# Patient Record
Sex: FEMALE | Race: WHITE | NOT HISPANIC OR LATINO | Employment: FULL TIME | URBAN - METROPOLITAN AREA
[De-identification: names, ages, dates, MRNs, and addresses within clinical notes are randomized per-mention and may not be internally consistent; named-entity substitution may affect disease eponyms.]

---

## 2018-09-22 PROBLEM — Z01.419 WELL WOMAN EXAM WITH ROUTINE GYNECOLOGICAL EXAM: Status: ACTIVE | Noted: 2018-09-22

## 2018-09-27 ENCOUNTER — APPOINTMENT (OUTPATIENT)
Dept: LAB | Facility: HOSPITAL | Age: 43
End: 2018-09-27

## 2018-09-27 ENCOUNTER — OFFICE VISIT (OUTPATIENT)
Dept: OBSTETRICS AND GYNECOLOGY | Facility: CLINIC | Age: 43
End: 2018-09-27

## 2018-09-27 VITALS — SYSTOLIC BLOOD PRESSURE: 138 MMHG | RESPIRATION RATE: 14 BRPM | WEIGHT: 175 LBS | DIASTOLIC BLOOD PRESSURE: 72 MMHG

## 2018-09-27 DIAGNOSIS — N94.6 DYSMENORRHEA: ICD-10-CM

## 2018-09-27 DIAGNOSIS — R87.619 ABNORMAL CERVICAL PAPANICOLAOU SMEAR, UNSPECIFIED ABNORMAL PAP FINDING: ICD-10-CM

## 2018-09-27 DIAGNOSIS — N92.1 METRORRHAGIA: Primary | ICD-10-CM

## 2018-09-27 PROBLEM — E55.9 VITAMIN D DEFICIENCY: Status: ACTIVE | Noted: 2017-01-01

## 2018-09-27 LAB
HCT VFR BLD AUTO: 38.5 % (ref 34.5–44)
HGB BLD-MCNC: 11.6 G/DL (ref 11.5–15.5)
TSH SERPL DL<=0.05 MIU/L-ACNC: 1.81 MIU/ML (ref 0.35–5.35)

## 2018-09-27 PROCEDURE — 85018 HEMOGLOBIN: CPT | Performed by: OBSTETRICS & GYNECOLOGY

## 2018-09-27 PROCEDURE — 99214 OFFICE O/P EST MOD 30 MIN: CPT | Performed by: OBSTETRICS & GYNECOLOGY

## 2018-09-27 PROCEDURE — 85014 HEMATOCRIT: CPT | Performed by: OBSTETRICS & GYNECOLOGY

## 2018-09-27 PROCEDURE — 84443 ASSAY THYROID STIM HORMONE: CPT | Performed by: OBSTETRICS & GYNECOLOGY

## 2018-09-27 PROCEDURE — 36415 COLL VENOUS BLD VENIPUNCTURE: CPT | Performed by: OBSTETRICS & GYNECOLOGY

## 2018-09-27 RX ORDER — VENLAFAXINE 37.5 MG/1
37.5 TABLET ORAL 2 TIMES DAILY
COMMUNITY

## 2018-09-27 NOTE — PROGRESS NOTES
"Subjective   Chief Complaint   Patient presents with   • Gynecologic Exam     Es Landry is a 43 y.o. year old  presenting to be seen for her annual exam.     SEXUAL Hx:  She {IS/is not:20960::\"is\"} currently sexually active.  In the past year there {New sexual partners?:71617::\"there has been NO new sexual partners\"}.    Condoms {Condom use:09979::\"are never used\"}.  She {would/WOULD NOT:71043::\"would not\"} like to be screened for STD's at today's exam.  Current birth control method: {Current contraceptive:66567}.  She {IS/is not:47139::\"is\"} happy with her current method of contraception and {does / DOES NOT:95667::\"does not\"} want to discuss alternative methods of contraception.  MENSTRUAL Hx:  Patient's last menstrual period was 2018 (approximate).  In the past 6 months her cycles have been {Desc - menses description:51288::\"regular, predictable and occur monthly\"}.  Her menstrual {Misc; menses description:06740::\"flow is typically normal\"}.   Each month on average there are roughly {Numbers; 0-7:85383::\"0\"} day(s) of very heavy flow.    Intermenstrual bleeding is {absent/present:15329::\"absent\"}.    Post-coital bleeding is {absent/present:85438::\"absent\"}.  Dysmenorrhea: {Affecting ADL?:55718::\"is not affecting her activities of daily living\"}  PMS: {Affecting ADL?:77815::\"is not affecting her activities of daily living\"}  Her cycles {are/ARE NOT:60206::\"are not\"} a source of concern for her that she wishes to discuss today.  HEALTH Hx:  She exercises regularly: {Responses; yes/NO/not asked:10718::\"no (and has no plans to become more active)\"}.  She wears her seat belt: {Responses; yes/not/not always (pre-select yes):41208::\"yes\"}.  She has concerns about domestic violence: {Responses; yes/NO/not asked:30978::\"no\"}.  OTHER THINGS SHE WANTS TO DISCUSS TODAY:  {Other complaints:35280::\"Nothing else\"}    The following portions of the patient's history were reviewed and updated as " "appropriate:{Misc - History reviewed:15162::\"problem list\",\"current medications\",\"allergies\",\"past family history\",\"past medical history\",\"past social history\",\"past surgical history\"}.    Smoking status: Never Smoker                                                                 Smokeless tobacco: Not on file                       Review of Systems  Constitutional POS: {Constitutional (+) ROS:51210::\"nothing reported\"}    NEG: {Constitutional (-) ROS:55143::\"anorexia\",\"night sweats\"}   Genitourinary POS: { (+) ROS:87875::\"nothing reported\"}    NEG: { (-) ROS:77951::\"dysuria\",\"hematuria\"}      Gastointestinal POS: {GI (+) ROS:52136::\"nothing reported\"}    NEG: {GI (-) ROS:43609::\"bloating\",\"change in bowel habits\",\"melena\",\"reflux symptoms\"}   Integument POS: {Skin (+) ROS:29763::\"nothing reported\"}    NEG: {Skin (-) ROS:00451::\"moles that are changing in size, shape, color\",\"rashes\"}   Breast POS: {Breast (+) ROS:21954::\"nothing reported\"}    NEG: {Breast (-) ROS:77855::\"persistent breast lump\",\"skin dimpling\",\"nipple discharge\"}        Objective   /72   Resp 14   Wt 79.4 kg (175 lb)   LMP 09/05/2018 (Approximate)   Breastfeeding? No     General:  {Exam - general findings:07614::\"well developed; well nourished\",\"no acute distress\"}   Skin:  {Exam - skin:25527::\"No suspicious lesions seen\"}   Thyroid: {Exam - thyroid:42501::\"normal to inspection and palpation\"}   Breasts:  {Exam - breasts:37392::\"Examined in supine position\",\"Symmetric without masses or skin dimpling\",\"Nipples normal without inversion, lesions or discharge\",\"There are no palpable axillary nodes\"}   Abdomen: {Exam - abdomen:38240::\"soft, non-tender; no masses\",\"no umbilical or inginual hernias are present\",\"no hepato-splenomegaly\"}   Pelvis: {Exam; GYN pelvic:18782::\"Clinical staff was present for exam\"}        Assessment   1. ***     Plan   1. ***  2. {Were Rx's given:57951}  3. The importance of keeping all planned follow-up and " "taking all medications as prescribed was emphasized.  4. Follow up {F/U reason:83936::\"for annual exam\"} *** {follow-up time:22868}    No orders of the defined types were placed in this encounter.         This note was electronically signed.    Alireza Fowler M.D.  September 27, 2018    Note: Speech recognition transcription software may have been used to create portions of this document.  An attempt at proofreading has been made but errors in transcription could still be present.  "

## 2018-10-01 ENCOUNTER — TELEPHONE (OUTPATIENT)
Dept: OBSTETRICS AND GYNECOLOGY | Facility: CLINIC | Age: 43
End: 2018-10-01

## 2018-10-01 NOTE — TELEPHONE ENCOUNTER
Dr. Fowler Pt    Pt returned a missed call.  Pt also  states she went to ER yesterday due to excessive bleeding and pain with nausea. ([facility]).    ER records have been requested.    Callback  [phone]    [facility] [place]

## 2018-10-18 ENCOUNTER — OFFICE VISIT (OUTPATIENT)
Dept: OBSTETRICS AND GYNECOLOGY | Facility: CLINIC | Age: 43
End: 2018-10-18

## 2018-10-18 VITALS — WEIGHT: 174 LBS | RESPIRATION RATE: 14 BRPM

## 2018-10-18 DIAGNOSIS — N92.1 METRORRHAGIA: Primary | ICD-10-CM

## 2018-10-18 DIAGNOSIS — N94.6 DYSMENORRHEA: ICD-10-CM

## 2018-10-18 PROCEDURE — 99213 OFFICE O/P EST LOW 20 MIN: CPT | Performed by: OBSTETRICS & GYNECOLOGY

## 2018-10-18 NOTE — PROGRESS NOTES
Subjective   Chief Complaint   Patient presents with   • Imaging Only     Es Landry is a 43 y.o. year old .  Patient's last menstrual period was 10/04/2018 (approximate).  She presents to be seen because of following tests to discuss management options.  We are still waiting on the Pap smear from her outside provider for review.  Her bleeding has been a problem for at least 2 years time.  It is bothersome and affecting her day-to-day life.    The following portions of the patient's history were reviewed and updated as appropriate:current medications and allergies    Smoking status: Never Smoker                                                                 Smokeless tobacco: Not on file                            Objective   Resp 14   Wt 78.9 kg (174 lb)   LMP 10/04/2018 (Approximate)   Breastfeeding? No     Lab Review   CBC and TSH    Imaging   Pelvic ultrasound images independantly reviewed - Uterus is anteverted.  There is a defect in the lower uterine segment consistent with her prior  section.  The endometrium is hyperechoic and thickened.  The myometrium is unremarkable other than a small fibroid in the subserosal region unrelated to the endometrial cavity        Assessment   1. Metrorrhagia with dysmenorrhea impacting her quality of life.  Thyroid function is normal and she is minimally anemic.     Plan   1. I explained to Es that she needs to be set up for a saline infusion sonogram.  The saline infusion sonogram will stratify her into 1 of 3 categories: if the endometrial echo complex is thin no additional workup is needed;  if the endometrial echo complex is uniformly thick endometrial sampling will be recommended; if there is focal pathology, a hysteroscopy with D & C be recommended to remove the focal pathology.  2. The importance of keeping all planned follow-up and taking all medications as prescribed was emphasized.  3. Follow up after ultrasound to determine treatment  options   4. I think she would be best served with a progestin withdrawal to clear the endometrium before the saline infusion is done    New Medications Ordered This Visit   Medications   • norethindrone (AYGESTIN) 5 MG tablet     Sig: Take 1 tablet by mouth Daily for 10 days.     Dispense:  10 tablet     Refill:  0          This note was electronically signed.    Alireza Fowler M.D.  October 18, 2018    Total time spent today with Es  was 20 minutes (level 3).  Off this time, 90% was spent face-to-face time coordinating care, answering her questions and counseling regarding pathophysiology of her presenting problem along with plans for any diagnositc work-up and treatment.    Note: Speech recognition transcription software may have been used to create portions of this document.  An attempt at proofreading has been made but errors in transcription could still be present.

## 2018-10-29 ENCOUNTER — DOCUMENTATION (OUTPATIENT)
Dept: OBSTETRICS AND GYNECOLOGY | Facility: CLINIC | Age: 43
End: 2018-10-29

## 2018-10-29 ENCOUNTER — TELEPHONE (OUTPATIENT)
Dept: OBSTETRICS AND GYNECOLOGY | Facility: CLINIC | Age: 43
End: 2018-10-29

## 2018-10-29 NOTE — PROGRESS NOTES
Spoke with Dr Fowler concerning pt statements and mental state. He also advises she seek care from her mental health provider and to scheduled her saline U/S 5-7 days from now.     Attempted to call pt with this news, no answer received. Message left for return call to office.

## 2018-10-29 NOTE — PROGRESS NOTES
"Pt called office to schedule saline U/S. Pt very upset, crying, yelling and ranting on phone about progesterone therapy causing her to lose her job, get  and lose custody of her kids.  Pt questioning if progesterone therapy would cause her to be \"crazy\", reassured pt medication would not be causing such extreme reactions and advised her to contact her psychiatrist for her emotional state.     Assured pt this nurse would consult Dr Fowler concerning her questions and concerns and then be in contact with her later today. Pt verbalizes understanding.      "

## 2018-10-29 NOTE — TELEPHONE ENCOUNTER
Dr Fowler pt    She was informed to come in for Saline U/S this Friday and she has questions regarding this ultrasound and is afraid it will be tramatic and painful.  She is already feeling anxious with the progesterone medication she is on.  She spoke with her psychiotrist and he informed her to speak to Dr Fowler about the reaction is having with all the medication.  She states she has not gone without her psych apppointments.      Please call to giver her some relief.

## 2018-10-29 NOTE — TELEPHONE ENCOUNTER
Spoke with pt reassured her progesterone therapy would not cause such large swings in mood as she is describing.     Assured pt saline U/S would be done on Friday as scheduled.

## 2018-11-02 ENCOUNTER — OFFICE VISIT (OUTPATIENT)
Dept: OBSTETRICS AND GYNECOLOGY | Facility: CLINIC | Age: 43
End: 2018-11-02

## 2018-11-02 DIAGNOSIS — N92.1 METRORRHAGIA: Primary | ICD-10-CM

## 2018-11-02 PROCEDURE — 58340 CATHETER FOR HYSTEROGRAPHY: CPT | Performed by: OBSTETRICS & GYNECOLOGY

## 2018-11-02 NOTE — PROGRESS NOTES
Saline Infusion Sonogram     Date of procedure:  November 2, 2018     Risks and benefits were discussed.  All questions were answered.  Consents given by the patient verbally.     Pre-op indication:  1. Metrorrhagia     Procedure documentation:    After the patient was identified and informed consent given she was placed in dorsal lithotomy position in stirrups and draped. A sterile speculum was placed inside the vagina with good visualization of the cervix and the cervix was cleaned with Betadine swabs.  The cervix was grasped with a single-tooth tenaculum.  A balloon catheter was introduced through the cervix without complication and inflated. The speculum was removed. The uterine cavity was then evaluated with transvaginal ultrasonography while saline was being instilled.    The findings were as follows:  · The bilayer endometrial stripe measured 2.6 mm.  · Focal lesions were absent    The balloon was then released and the cavity was then drained of saline and the catheter was removed. Scant bleeding was noted from the cervical lip and the procedure was completed. The patient tolerated the procedure well and was given follow-up instructions.      Impression: 1. Normal cavity without focal findings & with a thin uniform endometrium.  Significant endometrial pathology is unlikely   Plan: 1. No additional workup is needed     This note was electronically signed.    Alireza Fowler M.D.  November 2, 2018

## 2019-01-14 ENCOUNTER — TELEPHONE (OUTPATIENT)
Dept: OBSTETRICS AND GYNECOLOGY | Facility: CLINIC | Age: 44
End: 2019-01-14

## 2019-01-14 NOTE — TELEPHONE ENCOUNTER
Would let her know that the prior workup was only intended to make sure nothing else was wrong.  The workup showed there were no significant abnormalities.  The most likely reason for the bleeding relates to her age and weight.  If it bothers her we can talk about treatment options.  If it does not bother her nothing needs to be done at this time.

## 2019-01-14 NOTE — TELEPHONE ENCOUNTER
haydee bennett her bleeding has gone back to irregular since having an in office procedure a few months ago.

## 2020-07-15 ENCOUNTER — LAB (OUTPATIENT)
Dept: LAB | Facility: HOSPITAL | Age: 45
End: 2020-07-15

## 2020-07-15 ENCOUNTER — TELEPHONE (OUTPATIENT)
Dept: OBSTETRICS AND GYNECOLOGY | Facility: CLINIC | Age: 45
End: 2020-07-15

## 2020-07-15 ENCOUNTER — OFFICE VISIT (OUTPATIENT)
Dept: OBSTETRICS AND GYNECOLOGY | Facility: CLINIC | Age: 45
End: 2020-07-15

## 2020-07-15 VITALS — RESPIRATION RATE: 14 BRPM | WEIGHT: 184 LBS

## 2020-07-15 DIAGNOSIS — R10.32 LLQ ABDOMINAL PAIN: ICD-10-CM

## 2020-07-15 DIAGNOSIS — N92.1 METRORRHAGIA: Primary | ICD-10-CM

## 2020-07-15 DIAGNOSIS — R10.31 RLQ ABDOMINAL PAIN: ICD-10-CM

## 2020-07-15 DIAGNOSIS — K59.04 CHRONIC IDIOPATHIC CONSTIPATION: ICD-10-CM

## 2020-07-15 LAB
HCG INTACT+B SERPL-ACNC: <0.5 MIU/ML
HCT VFR BLD AUTO: 41.3 % (ref 34–46.6)
HGB BLD-MCNC: 13.8 G/DL (ref 12–15.9)

## 2020-07-15 PROCEDURE — 99214 OFFICE O/P EST MOD 30 MIN: CPT | Performed by: OBSTETRICS & GYNECOLOGY

## 2020-07-15 PROCEDURE — 85014 HEMATOCRIT: CPT | Performed by: OBSTETRICS & GYNECOLOGY

## 2020-07-15 PROCEDURE — 36415 COLL VENOUS BLD VENIPUNCTURE: CPT | Performed by: OBSTETRICS & GYNECOLOGY

## 2020-07-15 PROCEDURE — 84702 CHORIONIC GONADOTROPIN TEST: CPT | Performed by: OBSTETRICS & GYNECOLOGY

## 2020-07-15 PROCEDURE — 85018 HEMOGLOBIN: CPT | Performed by: OBSTETRICS & GYNECOLOGY

## 2020-07-15 NOTE — TELEPHONE ENCOUNTER
Please call Es and let her know that her ultrasound as relates to her right and left ovary was really pretty normal.  I see nothing that would explain pain.  I do strongly suspect her pain relates to constipation.  As relates to abnormal bleeding, I cannot exclude there being an endometrial polyp.  I would like to once again get her set up for a saline infusion sonogram like we did last year to see if she has an endometrial polyp present causing her abnormal menses.    Regarding the pain I would really like to see her do the things that I mentioned with Yani-Colace, Colace, MiraLAX and avoiding gluten.  If her pain is bowel function related I suspect this can take several months of normalization of bowel before her pain is can be improved.    We will talk more with her after we get the saline infusion sonogram arranged.

## 2020-07-15 NOTE — PATIENT INSTRUCTIONS
Chronic Constipation    Constipation is when a person has fewer bowel movements in a week than normal, has difficulty having a bowel movement, or has stools that are dry, hard, or larger than normal. Constipation may be caused by an underlying condition. It may become worse with age if a person takes certain medicines and does not take in enough fluids.    Follow these instructions at home:    Eating and drinking   · Eat foods that have a lot of fiber, such as fresh fruits and vegetables, whole grains, and beans.  · Limit foods that are high in fat, low in fiber, or overly processed.  · Also, foods high in gluten and lactose can be constipating.  · Drink enough fluid to keep your urine clear or pale yellow.  General instructions   · We have a biologic tendency to have a bowel movement early in the day (Orthocolic reflex) and after eating a meal (Gastrocolic reflex).  Also, caffeine can be a bowel stimulant  · Exercise regularly helps to promote bowel function.  · Go to the restroom when you have the urge to go. Do not hold it in.  · While on the toilet, elevating the feet on a stool may help in more complete elimination of your fecal wastes..  Over the Counter treatments that may be helpful:  · Start with Yani-colase twice daily for 2-3 weeks.  During this time frame MiraLax used on an as needed basis may help.  · After bowel function is beginning to improve, start supplemental dietary fiber (Metamucil, Citracal, Bene-fiber, Konsyl, etc) and transition from the Yani-colase twice daily to Colase twice daily  · As time goes on, you may be able to stop taking the Colase and maybe even the added fiber  Get help right away if:  · You have a fever and your symptoms suddenly get worse.  · You leak stool or have blood in your stool.  · Your abdomen is bloated.  · You have severe pain in your abdomen.  · You feel dizzy or you faint.

## 2020-07-15 NOTE — PROGRESS NOTES
Subjective   Chief Complaint   Patient presents with   • Menstrual Problem     passing clots been on cycle since 2020     Es Landry is a 45 y.o. year old .  No LMP recorded.  She presents to be seen because of vaginal bleeding and pelvic pain.  This is been going on for more than a year but is gotten significantly worse over the last month.  In the last several months no testing thus far has been done.    Bleeding is unpredictable.  It can be quite heavy.  There is some very large clots present.  She has the pain present both when she bleeds and when she does not bleed.    She was last seen here in 2018.  Saline fusion sonogram was done for metrorrhagia.  Bilateral endometrial stripe was thin (2.6 mm) and focal lesions were absent.  Ovaries were sonographically unremarkable.  There was no significant defect in the lower uterine segment at the site of her prior  section.  Her metrorrhagia was diagnosed as being related to anovulation.  At that time she had thyroid function studies done that were normal.  Hemoglobin was checked and was 11.6.    She has not been sexually active at all recently.  When she was sexually active contraception was not being practice.    She has a longstanding history of constipation.  She has a bowel movement about once per week.    The following portions of the patient's history were reviewed and updated as appropriate:current medications and allergies    Social History    Tobacco Use      Smoking status: Never Smoker    Review of Systems  Constitutional POS: nothing reported    NEG: anorexia or night sweats   Genitourinary POS: nothing reported    NEG: dysuria or hematuria   Gastointestinal POS: constipation (chronic)    NEG: bloating, change in bowel habits, melena or reflux symptoms   Integument POS: nothing reported    NEG: moles that are changing in size, shape, color or rashes   Breast POS: nothing reported    NEG: persistent breast lump, skin  dimpling or nipple discharge         Objective   Resp 14   Wt 83.5 kg (184 lb)   Breastfeeding No     General:  well developed; well nourished  no acute distress     Lab Review   See above    Imaging   See above        Assessment   1. Polymenorrhea with metrorrhagia most consistent with anovulation  2. Pelvic pain etiology unclear.  Suspect element of constipation is contributing  3. Prior history of mild anemia     Plan   1. The following tests were ordered today: HCG and HgB.  It was explained to Es that all lab test should be back within the one week after they are performed. She will be notified about the results, regardless of the findings. If she has not been contacted by the office within 2 weeks after the test has been performed, it is her responsibility to contact us to learn about her results.  2. Ultrasound needs to be done any time that is convenient for her.   3. Gave her information about improving bowel function with Orthocolic reflex, gastrocolic reflex, Yani-Colace with transition to Colace and minimizing use of gluten  4. The importance of keeping all planned follow-up and taking all medications as prescribed was emphasized.  5. Follow up after ultrasound     Total time spent today with Es  was 30 minutes (level 4).  Off this time, 60% was spent face-to-face time coordinating care, answering her questions and counseling regarding pathophysiology of her presenting problem along with plans for any diagnositc work-up and treatment.           This note was electronically signed.    Alireza Fowler M.D.  July 15, 2020    Note: Speech recognition transcription software may have been used to create portions of this document.  An attempt at proofreading has been made but errors in transcription could still be present.

## 2020-07-16 ENCOUNTER — TELEPHONE (OUTPATIENT)
Dept: OBSTETRICS AND GYNECOLOGY | Facility: CLINIC | Age: 45
End: 2020-07-16

## 2020-07-16 NOTE — TELEPHONE ENCOUNTER
Spoke with patient and she stated that she is not constipated.  She says that her pain is in her vagina and has went through a half a bag of pads since 8 am this morning.  She states that her pain right now is a 10.    Please advise.

## 2020-07-16 NOTE — TELEPHONE ENCOUNTER
Call patient and explained that I think her pain is most likely at least partially attributable to constipation.  I encouraged her to eliminate all gluten-containing products and start taking MiraLAX daily.  She is a little bit reticent to accept this diagnosis given that the pain is vaginal.  I told her the ultrasound really did not point to a gynecologic source.  I could not exclude endometriosis but I think that is not likely.  As relates to the bleeding I like to put her on some Aygestin and see what that does.  She has a saline infusion sonogram already set up next week.    New Medications Ordered This Visit   Medications   • norethindrone (Aygestin) 5 MG tablet     Sig: Take 1 tablet by mouth Daily.     Dispense:  10 tablet     Refill:  0

## 2020-07-16 NOTE — TELEPHONE ENCOUNTER
Pt is nancy for saline on 7/23/2020 but wants to know what she can do about the bleeding and pain until her appt. Please contact back

## 2020-07-16 NOTE — TELEPHONE ENCOUNTER
First and foremost I think we need to get her set up for a saline infusion sonogram to find out if there is an endometrial polyp causing her abnormal bleeding.  As relates to the pain I cannot easily explain the pain by anything seen at the time of the ultrasound.  She and I can talk more about options such as laparoscopy to investigate if there is a gynecologic source.  We really need to get the saline infusion sonogram set up first to look into the bleeding.  After that is done we can talk about laparoscopy for diagnosis to see if there are some other explanation for her pain that is not showing up on ultrasound.  I would emphasize to her that although she does not appear constipated, many times pain is related to GI motility issues or constipation that is worse than she recognizes.

## 2020-07-16 NOTE — TELEPHONE ENCOUNTER
Provider Name  Dr Fowler  Reason for Call  Patient calling for ultra sound results from yesterday, 7/15/2020, having extreme bleeding and pain, please call her  Pharmacy Name  [facility] in [place], [place]  Call Back Number  [phone]

## 2020-07-23 ENCOUNTER — OFFICE VISIT (OUTPATIENT)
Dept: OBSTETRICS AND GYNECOLOGY | Facility: CLINIC | Age: 45
End: 2020-07-23

## 2020-07-23 DIAGNOSIS — N92.1 METRORRHAGIA: Primary | ICD-10-CM

## 2020-07-23 PROCEDURE — 58340 CATHETER FOR HYSTEROGRAPHY: CPT | Performed by: OBSTETRICS & GYNECOLOGY

## 2020-07-23 RX ORDER — MEDROXYPROGESTERONE ACETATE 10 MG/1
TABLET ORAL
Qty: 10 TABLET | Refills: 6 | Status: SHIPPED | OUTPATIENT
Start: 2020-07-23 | End: 2021-01-25

## 2020-07-23 NOTE — PROGRESS NOTES
Saline Infusion Sonogram     Date of procedure:  2020     Risks and benefits were discussed.  All questions were answered.  Consents given by the patient verbally.     Pre-op indication:  1. Metrorrhagia with polymenorrhea     Procedure documentation:    After the patient was identified and informed consent given she was placed in dorsal lithotomy position in stirrups and draped. A sterile speculum was placed inside the vagina with good visualization of the cervix and the cervix was cleaned with Betadine swabs.  The cervix was grasped with a single-tooth tenaculum.  A balloon catheter was introduced through the cervix without complication and inflated. The speculum was removed. The uterine cavity was then evaluated with transvaginal ultrasonography while saline was being instilled.    The findings were as follows:  · The bilayer endometrial stripe measured 6.5 mm.  · Focal lesions were absent    The balloon was then released and the cavity was then drained of saline and the catheter was removed. Scant bleeding was noted from the cervical lip and the procedure was completed. The patient tolerated the procedure well and was given follow-up instructions.      Impression: 1. Normal cavity without focal findings & with a thin uniform endometrium.  Significant endometrial pathology is unlikely   2. History of pelvic pain with uncertain etiology.  She started a bowel regimen and is having more bowel function.  Is yet to notice a difference in her pain but it is probably too early   Plan: 1. No additional workup is needed   2. Like to see her back in about 60 days to see if the pain is improving she continues with a bowel regimen we discussed    New Medications Ordered This Visit   Medications   • medroxyPROGESTERone (Provera) 10 MG tablet     Si pill daily day 1 through 10 each month     Dispense:  10 tablet     Refill:  6        This note was electronically signed.    Alireza Fowler M.D.    2020

## 2020-10-06 ENCOUNTER — TELEPHONE (OUTPATIENT)
Dept: OBSTETRICS AND GYNECOLOGY | Facility: CLINIC | Age: 45
End: 2020-10-06

## 2020-10-07 NOTE — TELEPHONE ENCOUNTER
Spoke w/pt; pt states she is doing some better today and yesterday. Pt states she is taking antibiotics currently for UTI, states her bleeding has slowed down, not passing clots. Pt states she is still cramping but thinks it is related to the UTI. Pt was advised to get her records faxed to us; pt verbalized understanding

## 2020-10-07 NOTE — TELEPHONE ENCOUNTER
Dr Fowler    Patient calling since she went to [facility] and miscarried. Sent patient a release of medical information form and per pt do you need to see her before her 10/21/20 scheduled appointment.    Pt call back [phone]

## 2020-10-21 ENCOUNTER — OFFICE VISIT (OUTPATIENT)
Dept: OBSTETRICS AND GYNECOLOGY | Facility: CLINIC | Age: 45
End: 2020-10-21

## 2020-10-21 VITALS — WEIGHT: 198 LBS | RESPIRATION RATE: 14 BRPM

## 2020-10-21 DIAGNOSIS — N92.1 METRORRHAGIA: Primary | ICD-10-CM

## 2020-10-21 DIAGNOSIS — K59.04 CHRONIC IDIOPATHIC CONSTIPATION: ICD-10-CM

## 2020-10-21 PROCEDURE — 99214 OFFICE O/P EST MOD 30 MIN: CPT | Performed by: OBSTETRICS & GYNECOLOGY

## 2020-10-21 RX ORDER — ONDANSETRON 4 MG/1
TABLET, ORALLY DISINTEGRATING ORAL
COMMUNITY
Start: 2020-09-09

## 2020-10-21 RX ORDER — LABETALOL 200 MG/1
TABLET, FILM COATED ORAL
COMMUNITY
Start: 2020-09-30

## 2020-10-21 RX ORDER — CYCLOBENZAPRINE HCL 5 MG
TABLET ORAL
COMMUNITY
Start: 2020-10-17 | End: 2021-01-25

## 2020-10-21 RX ORDER — IBUPROFEN 600 MG/1
TABLET ORAL
COMMUNITY
Start: 2020-10-13

## 2020-10-21 NOTE — PROGRESS NOTES
Subjective   Chief Complaint   Patient presents with   • Medication Reaction       Es Landry is a 45 y.o. year old .  No LMP recorded.  She presents because of follow-up regarding pelvic pain.  She also had issues with bleeding earlier in October when she went to the emergency room at [facility] for further evaluation.  She was at [facility] where studies were done including an ultrasound which was unremarkable.  She was told she had a blighted ovum.  This was done in October.  While at [facility] she saw providers in the emergency room related to bleeding and pain.  At that time:  · pregnancy test was negative.  ]  · Urinalysis did show some blood and nitrates  · Comprehensive metabolic panel was negative labs also showed she was slightly anemic with a hemoglobin of 10.5.  · Urine drug screen was negative test for chlamydia and gonorrhea were negative  · Urine culture showed 25,000 colony-forming units of group B strep    She was here in July related to problems with bleeding.  She was diagnosed with anovulatory bleeding after a saline infusion sonogram was normal.  She was placed on cyclic progesterone the first 10 days out of every month to see if that made a difference in her bleeding.  She did it for the next 2 consecutive months and her bleeding was much more predictable.  When it was time to do with the third month she had some unpredictable bleeding at which point she was told she had a miscarriage at the time that she went to be seen in the emergency room at [facility].    When she was last here there was issues with pain.  We talked about some over-the-counter remedies to try to help this.  It helped a little bit but not a lot.  She ended up having to stop that medicine.  She is now using a probiotic and is helping a little bit but she still does have issues with constipation.  She still does have issues with swelling and pressure-like belly pain especially since the ER  visit.    The following portions of the patient's history were reviewed and updated as appropriate:no additional history reviewed    Social History    Tobacco Use      Smoking status: Never Smoker       Objective   Resp 14   Wt 89.8 kg (198 lb)   Breastfeeding No     Lab Review   No data reviewed    Imaging   No data reviewed        Assessment   1. Dysfunctional uterine bleeding with metrorrhagia partially controlled with cyclic progestin.  May benefit from a low-dose pill if this does not continue to improve  2. Pelvic pain highly likely to be related to bowel dysfunction.  Not yet remedied with over-the-counter medication     Plan   1. Offered a low-dose birth control pill or cyclic progesterone.  For now she would like to give the cyclic progesterone to try for a couple more months and see how that goes.  2. As it relates to the pain I like to try her on some Linzess and see if that makes a difference in her pain  3. The importance of keeping all planned follow-up and taking all medications as prescribed was emphasized.  4. Follow up for recheck of pain in 3 months     New Medications Ordered This Visit   Medications   • linaclotide (Linzess) 290 MCG capsule capsule     Sig: Take 1 capsule by mouth Every Morning Before Breakfast. Take 30 minutes before first daily meal.     Dispense:  30 capsule     Refill:  4          This note was electronically signed.    Alireza Fowler M.D.  October 21, 2020    Total time spent today with Es  was 30 minutes (level 4).  Off this time, 90% was spent face-to-face time coordinating care, answering her questions and counseling regarding pathophysiology of her presenting problem along with plans for any diagnositc work-up and treatment.    Note: Speech recognition transcription software may have been used to create portions of this document.  An attempt at proofreading has been made but errors in transcription could still be present.

## 2020-10-29 ENCOUNTER — TELEPHONE (OUTPATIENT)
Dept: OBSTETRICS AND GYNECOLOGY | Facility: CLINIC | Age: 45
End: 2020-10-29

## 2020-10-29 NOTE — TELEPHONE ENCOUNTER
PATIENT IS CALLING TODAY ABOUT PRE- APPROVAL FOR HER LINZESS. SHE SAID THE PHARMACY TOLD HER IT NEEDED A PRE-APPROVAL.

## 2020-10-30 ENCOUNTER — TELEPHONE (OUTPATIENT)
Dept: OBSTETRICS AND GYNECOLOGY | Facility: CLINIC | Age: 45
End: 2020-10-30

## 2020-10-30 NOTE — TELEPHONE ENCOUNTER
Looks like we received authorization for her Linzess 290 mcg capsules.   Can you check with her and see if that medicine needs to be called in already or if the pharmacy is filling it?  Thank you.

## 2020-10-30 NOTE — TELEPHONE ENCOUNTER
Linzess was sent to patient's pharmacy to 10/21/2020. [phone] called [facility] pharmacy to see if the authorization went through, the authorization went through for $0 copay. [phone] called patient and left a detailed message letting her know the authorization went through on her prescription and she has a $0 copay.    Brice Serrato, CMA

## 2021-01-25 ENCOUNTER — OFFICE VISIT (OUTPATIENT)
Dept: OBSTETRICS AND GYNECOLOGY | Facility: CLINIC | Age: 46
End: 2021-01-25

## 2021-01-25 VITALS — RESPIRATION RATE: 14 BRPM | WEIGHT: 189 LBS

## 2021-01-25 DIAGNOSIS — N92.6 IRREGULAR MENSES: ICD-10-CM

## 2021-01-25 DIAGNOSIS — K59.04 CHRONIC IDIOPATHIC CONSTIPATION: Primary | ICD-10-CM

## 2021-01-25 PROCEDURE — 99213 OFFICE O/P EST LOW 20 MIN: CPT | Performed by: OBSTETRICS & GYNECOLOGY

## 2021-01-25 NOTE — PROGRESS NOTES
Subjective   Chief Complaint   Patient presents with   • Follow-up     Es Landry is a 45 y.o. year old .  Patient's last menstrual period was 10/21/2020 (approximate).  She presents to be seen because of ongoing issues with pain and bleeding.  When she was here last we talked about use of Linzess.  She used it for less than a month.  Since she is used it she is now having bowel movements at least once a day.  Shape of the stool is normal and nonliquidy.  When she was here last she also had issues with bleeding.  She had been on a progestin only birth control pill.  We talked about adding estrogen but she did not to try that at this point.  She has had an ultrasound done in October at an outside institution that was normal.  Reportedly had a Pap smear done at [facility] back around  and again in .  Those records have never been tested.    She is taking Provera cycles day 1 through 10.  She usually bleeds in the week following her Provera but sometimes she will bleed while taking the pills.    She is equally bothered by both her bleeding and her pain.    OTHER THINGS SHE WANTS TO DISCUSS TODAY:  Nothing else    The following portions of the patient's history were reviewed and updated as appropriate:current medications and allergies    Social History    Tobacco Use      Smoking status: Never Smoker    Review of Systems  Constitutional POS: nothing reported    NEG: anorexia or night sweats   Genitourinary POS: nothing reported    NEG: dysuria or hematuria   Gastointestinal POS: see HPI    NEG: bloating, change in bowel habits, melena or reflux symptoms   Integument POS: nothing reported    NEG: moles that are changing in size, shape, color or rashes   Breast POS: nothing reported    NEG: persistent breast lump, skin dimpling or nipple discharge         Objective   Resp 14   Wt 85.7 kg (189 lb)   LMP 10/21/2020 (Approximate)   Breastfeeding No       Pelvis: Clinical staff was  present for exam  External genitalia:  normal appearance of the external genitalia including Bartholin's and [place]'s glands.  :  urethral meatus normal;  Vaginal:  normal pink mucosa without prolapse or lesions. blood present -  dark red;  Cervix:  normal appearance.  Uterus:  normal size, shape and consistency.  Adnexa:  non palpable bilaterally.  Rectal:  digital rectal exam not performed; anus visually normal appearing.  Cystocele GRADE 0  Rectocele GRADE 0  Uterine GRADE 0       Lab Review   No data reviewed    Imaging   Pelvic ultrasound report        Assessment   1. Chronic pelvic pain unrelieved with short course of Linzess  2. Irregular menses suboptimally controlled on cyclic Provera use.  Prior work-up here in the office including ultrasound and saline infusion sonogram were normal  3. Prior history of abnormal Pap smear with follow-up in outside institution.  Records need to be obtained for completion     Plan   1. The following data needs to be obtained to update her medical records: Pap smear and cervical biopsy reports.  2. The importance of keeping all planned follow-up and taking all medications as prescribed was emphasized.  3. At this point I think treatment options as relates to her bleeding are best controlled with either a low-dose birth control pill or IUD.  I need to make sure that her Pap smear and cervical biopsy reports are clear before we can get on that route.  4. As relates to pain I am really interested to see if her pain is improved with use of her birth control pill or IUD.  If that does not help, next steps would be laparoscopy to evaluate for gynecologic source.  I do strongly believe that her pain is more colorectal in origin given her past history but cannot exclude gynecologic sources  5. At this point I like to try low-dose birth control pill and stop the Provera and see her back.  If that does not help I think next step is laparoscopic evaluation      New Medications Ordered  This Visit   Medications   • norethindrone-ethinyl estradiol (Necon 1/35, 28,) 1-35 MG-MCG per tablet     Sig: Take 1 tablet by mouth Daily.     Dispense:  28 tablet     Refill:  4          This note was electronically signed.    Alireza Fowler M.D.  January 25, 2021    Note: Speech recognition transcription software may have been used to create portions of this document.  An attempt at proofreading has been made but errors in transcription could still be present.

## 2021-01-28 ENCOUNTER — TELEPHONE (OUTPATIENT)
Dept: OBSTETRICS AND GYNECOLOGY | Facility: CLINIC | Age: 46
End: 2021-01-28

## 2021-01-28 NOTE — TELEPHONE ENCOUNTER
Caida, Mecánica [Fall, Mechanical]  Usted se ha caído hoy y parece que la causa fue “mecánica”. Backus quiere decir que se resbaló, tropezó o perdió el equilibrio. Si mcginnis caída fue causada por desmayo o convulsión, será necesario hacer pruebas adicionales.  Cuidado En Casa:  1. Descanse hoy y vuelva a jewel actividades normales tan pronto hemalatha se sienta normal.  2. Si se lastimó eriberto la caída, siga los consejos de mcginnis medico en cuanto al cuidado de mcginnis lesión.  3. Puede usar acetaminofén (Tylenol) o ibuprofeno (Motrin, Advil) para controlar el dolor, a menos que le receten otro medicamento para el dolor. [NOTA: Si sufre de enfermedad del hígado o riñones, o alguna vez tuvo krishna úlcera estomacal o sangrado gastrointestinal, hable con mcginnis médico antes de usar estos medicamentos.]  Caida Prevención:  · ¿Hubo algo que causó mcginnis caída que podría arreglar, quitar o reemplazar?  · Asegúrese mcginnis casa manteniendo los pasillos libres de objetos que podrían hacerle tropezar.  · Use forros antideslizantes debajo de las alfombras.  · No camine en lugares mal iluminados.  · No se pare en darcy o escaleras inestables.  · Tenga cuidado al alcanzar objetos elevados o al mirar hacia arriba. Esta posición puede causar pérdida del equilibrio.  · Asegúrese de calzar zapatos que le queden stephie, con suelas antideslizantes y en buenas condiciones.  · Tenga cuidado al subir y bajar de los bordes de la acera o al caminar en aceras desparejas.  · Si tiene mal equilibrio, considere usar un bastón o andadera.  · Manténgase tan activo hemalatha pueda. Un buen equilibrio, flexibilidad, fortaleza y resistencia son producto del ejercicio, y todos juegan un papel importante en la prevención de las caídas.  Seguimiento  con mcginnis médico o de acuerdo a las indicaciones de nuestro personal.  Busque Prontamente Atención Médica  si algo de lo siguiente ocurre:  · Caídas mecánicas constantes, o krishna otra caída inexplicada  · Mareos, desmayos o convulsión  · Dolor de  If she keeps feeling bad sh will need to come to the ER for evaluation.  I want her to get some blood  work done.  She needs a hemoglobin checked.  She can come to [place] or see if her PCP is able to order it closer to home.   emerson severo  · Dolor de pecho y falta de aire  · Palpitaciones (latidos del corazón muy rápidos, muy lentos o irregulares)  · Wyatt en el vómito o las heces (color negruzco o rojizo)  · Debilidad o entumecimiento en un lado del cuerpo  · Dificultades con el habla o la visión  © [id number] [employer]. [address] [place] [id number]. Todos los derechos reservados. Esta información no pretende sustituir la atención médica profesional. Sólo mcginnis médico puede diagnosticar y tratar un problema de rica.          Dolor de hombro de causa no determinada  El dolor de hombro puede tener muchas causas. Es frecuente que provenga de las estructuras que rodean la articulación del hombro. Estas son la cápsula de la articulación, los ligamentos, los tendones, los músculos y la bolsa sinovial. El dolor también puede provenir de un cartílago de la articulación, que se puede gastar o lesionar. Es importante saber cuál es la causa de mcginnis dolor para que mcginnis proveedor de atención médica pueda darle el tratamiento correcto. Sin embargo, a veces es difícil encontrar la causa exacta de un dolor de hombro. Es posible que usted necesite consultar a un especialista (ortopedista). También es posible que necesite hacerse pruebas especiales, hemalatha krishna tomografía computarizada o krishna resonancia magnética. El proveedor nikkie vez necesite usar instrumentos especiales para observar dentro de la articulación (artroscopia).  El dolor de hombro se puede tratar con un cabestrillo o con un dispositivo que impide que mcginnis hombro se mueva. Usted puede rich un medicamento antiinflamatorio hemalatha, por ejemplo, ibuprofeno para aliviar el dolor. Y nikkie vez necesite hacer ejercicios de hombro especiales. Kumar un seguimiento con un especialista si el dolor es caryn o no desaparece después de unas semanas.  Cuidados en la casa  Siga estos consejos para cuidarse en mcginnis casa:  · Si le dieron un cabestrillo para usar, déjeselo puesto por el tiempo que  le haya recomendado mcginnis proveedor de atención médica. Si no está seguro de cuánto tiempo debe usarlo, consulte. Si se afloja, ajústelo de manera nikkie que mcginnis antebrazo quede horizontal (respecto del suelo). Debe sentir que mcginnis hombro está stephie sostenido.  · Aplique krishna compresa de hielo sobre la leroy afectada eriberto 20 minutos cada krishna o dos horas el primer día. Puede hacer mcginnis propia compresa colocando cubos de hielo en krishna bolsa plástica. Envuelva la bolsa en krishna toalla delgada. Siga usando la compresa de hielo david o cuatro veces al día en los dos marcos siguientes. Luego, úsela cuando lo necesite para aliviar el dolor y la inflamación.  · Puede usar paracetamol (acetaminofén) o ibuprofeno para aliviar el dolor, a menos que le hayan recetado otro medicamento. Si tiene krishna enfermedad crónica del hígado o de los riñones, hable con mcginnis proveedor de atención médica antes de usar estos medicamentos. También hable con mcginnis proveedor si tiene krishna úlcera de estómago o sangrado gastrointestinal.  · Es posible que el dolor de hombro parezca empeorar de noche, cuando hay menos factores para distraerle del dolor. Si usted duerme de lado, trate de no apoyar peso en el hombro que le duele. Colocar almohadas detrás de usted puede ayudarle a impedir que se de vuelta y se apoye en frances hombro mientras duerme.  · Las articulaciones del hombro pueden ponerse rígidas si se berto colocado un cabestrillo por demasiado tiempo. Usted debe comenzar a hacer ejercicios de rango de movimiento aproximadamente pasados los 10 días de ocurrida la lesión. Hable con mcginnis proveedor para averiguar qué tipo de ejercicios hacer y qué suarez rápido comenzarlos.  · Puede quitarse el cabestrillo para ducharse o bañarse.  Visita de control  Consulte a mcginnis proveedor de atención médica para hacer un seguimiento si no comienza a sentirse mejor en los próximos jonnathan días.  ¿Cuándo debe buscar atención médica?  Llame a mcginnis proveedor de atención médica de inmediato si tiene  cualquiera de los siguientes síntomas:  · Dolor o hinchazón que empeora o continúa por más de algunos días  · Mcginnis mano o jewel dedos se sienten fríos o entumecidos, se caty azulados, o siente cosquilleo en casey leroy  · Muchos moretones en mcginnis hombro o la parte superior de mcginnis brazo  · Tiene dificultad para  mcginnis mano o jewel dedos  · Tiene debilidad en mcginnis mano o jewel dedos  · Mcginnis hombro se pone rígido  · Siente hemalatha si mcginnis hombro se saliera de lugar  · Tiene alguna dificultades para hacer jewel actividades diarias  © [id number] [employer]. [address] [place] [id number]. Todos los derechos reservados. Esta información no pretende sustituir la atención médica profesional. Sólo mcginnis médico puede diagnosticar y tratar un problema de rica.

## 2021-01-28 NOTE — TELEPHONE ENCOUNTER
HELEN GUTIERREZ WAS PUT ON DIFFERENT MEDS TO TRY. SAYS NOW SHE HAS A REALLY FOUL ODOR, DIZZY AND LIGHT HEADED. PADS ARE SEEPING WITH BLOOD. THERE IS SOME PAIN. FEELS LIKE SHE IS GONNA PASS OUT.

## 2021-01-29 NOTE — TELEPHONE ENCOUNTER
Only if it is exceptionally heavy periods can take some time for the new pills to work.  Only wanted to go to the ER if she is having issues with pain that is significant.  She can get blood work locally or she can come down here?

## 2021-01-29 NOTE — TELEPHONE ENCOUNTER
Spoke with patient and advised her that Dr. Fowler would like for her to be evaluated at the ER if she keeps feeling bad.  I also advised her that she needs to have some blood work done.  She stated that the bleeding is no longer a dark purple, it is now a bright red.  She stated that she has been passing clots that range in size from the size a quarter to the size of a dessert plate.  Should she go ahead and go to the ER to be evaluated for the bleeding?

## 2021-01-29 NOTE — TELEPHONE ENCOUNTER
Spoke with patient and advised her that it could take some time for the new medication to work and that he only wants her to go to the ER if she is having issues with pain that is significant.  Patient stated that the pain she is having is extremely painful and she has already been bleeding for 2 months.  She stated that she just wants to find out what is wrong with her.  She stated that she didn't want to go on this medication to begin with and since being on it for 2 months the bleeding has not got any better.  Patient is going to the ER to be evaluated.  I advised her that if she went to the ER to go to a [facility] facility so that Dr. Fowler can follow her care.  Patient verbalized understanding and had no questions at this time.

## 2021-01-29 NOTE — TELEPHONE ENCOUNTER
Attempted to contact patient and there was no answer.  Message was left for her to give me a call back.

## 2021-02-18 ENCOUNTER — TELEPHONE (OUTPATIENT)
Dept: OBSTETRICS AND GYNECOLOGY | Facility: CLINIC | Age: 46
End: 2021-02-18

## 2021-02-18 NOTE — TELEPHONE ENCOUNTER
DR CERVANTES PT    Started birth control the day of her last visit, she is continuing to bleed like a regular period, clotting is not as bad. vagina hurts after she urinates, feet, face and belly are swollen.    [phone]

## 2021-02-18 NOTE — TELEPHONE ENCOUNTER
Do you want to see her for her vaginal pain after urination and feet, face, and belly swelling or should I send her to her pcp/ urgent care?

## 2021-02-18 NOTE — TELEPHONE ENCOUNTER
Would reassure her that it may take up to 3 completed packs of birth control pills before she can notice any significant difference in her bleeding patterns

## 2021-02-18 NOTE — TELEPHONE ENCOUNTER
As relates to the vaginal pain after urination, I guess that would be a for us to see.  So far as the swelling of the feet face and belly, she has been extensively worked up before.  I suspect this mostly relates to her chronic constipation.  I would tell it is to hang in there and give it a couple more days and see how things go

## 2021-02-23 NOTE — TELEPHONE ENCOUNTER
Spoke w/pt; pt states the pain stopped but she is spotting again. Pt was advised it can take up to 3 months before we see a difference in her bleeding. Pt also states her BP is starting to run high compared to before starting her OCP.   Pt states it is running 155/80 and she is taking her BP medication. Pt states her BP was normal 128/75 before.    Pt advised I would route message to Dr. Fowler for review

## 2021-02-23 NOTE — TELEPHONE ENCOUNTER
We can set her up for diagnostic laparoscopy to look into the source of her pain. She needs to understand this will not help her abnormal bleeding. Endometrial ablation will be an option to help her bleeding but if she has this done she needs to understand this is not a contraceptive procedure. If she is sexually active currently or become sexually active in the future, I would highly encourage her to have a tubal ligation done at the same time. On the other hand if she chose an IUD, this would serve both a treatment for her bleeding and contraception.    At this point I think there to be the options out there for us to do this with phone encounters back-and-forth. I think she really needs an appointment to be seen so we can talk through the options available.

## 2021-02-23 NOTE — TELEPHONE ENCOUNTER
Spoke w/pt; pt was upset during conversation as she does not want an IUD. She stated an exploratory surgery was an option the last time she was in the office, and was curious if this can be scheduled? Or have blood work ordered to figure out what is going on. Pt was offered an appointment to see Dr Fowler to discuss all of this but she declined. Pt advised I would route message to Dr Fowler in regards.

## 2021-02-23 NOTE — TELEPHONE ENCOUNTER
I am glad she is beginning to feel better.  If her blood pressure continues to be an issue, she might need to talk to her primary care about adjusting her blood pressure medicine.  As long as we can keep her blood pressure well controlled on medication, I see no reason we could not continue with her birth control

## 2021-02-23 NOTE — TELEPHONE ENCOUNTER
Spoke w/pt; pt states she does not want to continue with the birth control pills and does not want to adjust her BP medication as her PCP advised she needed to speak w/Dr Fowler. Please advise

## 2021-02-23 NOTE — TELEPHONE ENCOUNTER
If she does not want to continue with her birth control pill, options are going to be placed an IUD or endometrial ablation.  I think we spoken a bit about that in the past.  If she wants to schedule I of those let me know.  If she has questions I am happy to speak to her about that